# Patient Record
Sex: FEMALE | Race: WHITE | NOT HISPANIC OR LATINO | URBAN - METROPOLITAN AREA
[De-identification: names, ages, dates, MRNs, and addresses within clinical notes are randomized per-mention and may not be internally consistent; named-entity substitution may affect disease eponyms.]

---

## 2022-07-13 ENCOUNTER — EMERGENCY (EMERGENCY)
Facility: HOSPITAL | Age: 32
LOS: 1 days | Discharge: HOME | End: 2022-07-13
Admitting: EMERGENCY MEDICINE

## 2022-07-13 VITALS
HEART RATE: 64 BPM | SYSTOLIC BLOOD PRESSURE: 126 MMHG | OXYGEN SATURATION: 100 % | RESPIRATION RATE: 18 BRPM | DIASTOLIC BLOOD PRESSURE: 65 MMHG | TEMPERATURE: 98 F

## 2022-07-13 PROCEDURE — 99283 EMERGENCY DEPT VISIT LOW MDM: CPT

## 2022-07-13 PROCEDURE — 99053 MED SERV 10PM-8AM 24 HR FAC: CPT

## 2022-07-13 NOTE — ED ADULT TRIAGE NOTE - ESI TRIAGE ACUITY LEVEL, MLM
Progress Note  Patient: Jordin Jo  Unit/Bed: Q069/O380-63  YOB: 1971  MRN: [id number]  Acct: [de-identified]   Admitting Diagnosis: Palpitations [R00.2]  Hypokalemia [E87.6]  Hypomagnesemia [E83.42]  Essential hypertension [I10]  Hyperglycemia [R73.9]  Drug noncompliance [Z91.14]  Encounter for medication refill [Z76.0]  Uncontrolled type 2 diabetes mellitus with hyperglycemia ([address] [E11.65]  Chest pain, unspecified type [R07.9]  Admit Date:  2022  Hospital Day: 0    Chief Complaint: CP HypoK    Histories:  Past Medical History:   Diagnosis Date    CAD (coronary artery disease) 2022    CAD (coronary artery disease) 2022    Depression     Environmental allergies     Gastroparesis     GERD (gastroesophageal reflux disease)     Headache     Dr. Melisa Khoury HPV in female     Hyperlipidemia     Hypertension     Leukocytosis     Migraines     Type II or unspecified type diabetes mellitus without mention of complication, not stated as uncontrolled      Past Surgical History:   Procedure Laterality Date    BONE MARROW BIOPSY      (-[facility]    BREAST REDUCTION SURGERY     [address] [place] [address]      (-)[facility]    CHOLECYSTECTOMY      ENDOMETRIAL ABLATION  ?     GALLBLADDER SURGERY       Family History   Problem Relation Age of Onset    Heart Disease Mother     Diabetes Father     Heart Disease Father     Cancer Father         thyroid    No Known Problems Sister     No Known Problems Paternal Grandfather     No Known Problems Paternal Grandmother     No Known Problems Maternal Grandmother     No Known Problems Maternal Grandfather     No Known Problems Brother     No Known Problems Other     Breast Cancer Neg Hx     Colon Cancer Neg Hx     Eclampsia Neg Hx     Hypertension Neg Hx     Ovarian Cancer Neg Hx      Labor Neg Hx     Spont Abortions Neg Hx     Stroke Neg Hx      Social History Socioeconomic History    Marital status:      Spouse name: None    Number of children: None    Years of education: None    Highest education level: None   Occupational History    None   Tobacco Use    Smoking status: Never Smoker    Smokeless tobacco: Never Used   Vaping Use    Vaping Use: Never used   Substance and Sexual Activity    Alcohol use: Yes     Alcohol/week: 0.0 standard drinks     Comment: ocassionally    Drug use: No    Sexual activity: Not Currently     Partners: Male   Other Topics Concern    None   Social History Narrative    None     Social Determinants of Health     Financial Resource Strain: High Risk    Difficulty of Paying Living Expenses: Hard   Food Insecurity: Food Insecurity Present    Worried About Running Out of Food in the Last Year: Often true    Gayle of Food in the Last Year: Often true   Transportation Needs:     Lack of Transportation (Medical): Not on file    Lack of Transportation (Non-Medical): Not on file   Physical Activity:     Days of Exercise per Week: Not on file    Minutes of Exercise per Session: Not on file   Stress:     Feeling of Stress : Not on file   Social Connections:     Frequency of Communication with Friends and Family: Not on file    Frequency of Social Gatherings with Friends and Family: Not on file    Attends [employer] Services: Not on file    Active Member of [address] [place] [address] or Organizations: Not on file    Attends Club or Organization Meetings: Not on file    Marital Status: Not on file   Intimate Partner Violence:     Fear of Current or Ex-Partner: Not on file    Emotionally Abused: Not on file    Physically Abused: Not on file    Sexually Abused: Not on file   Housing Stability:     Unable to Pay for Housing in the Last Year: Not on file    Number of [place] in the Last Year: Not on file    Unstable Housing in the Last Year: Not on file       Subjective/HPI  More diarrhea this am. This is chronic for her.  No cp no Priority: Low    Essential hypertension [I10]      Priority: Low        Assessment/Plan:  1. CP- does not appear to be cardiac. 2. HTN - stable low salt diet. Continue meds  3. HypoK- likely secondary to Chronic Diarrhea but can not rule out primary Hyperaldosteronism. replace K. And Mag. She wants smaller tab. I will change it to 20 (2tabs) bid. Total 80 meq qd. 4. Will add PO Mag as well. 5. LVEF 55%  6. OK for dc- f/u w me 3 weeks.  I will recheck Labs as out pt.           Electronically signed by Chandler Bethea MD on 6/26/2022 at 10:53 AM 4

## 2022-07-13 NOTE — ED ADULT TRIAGE NOTE - CHIEF COMPLAINT QUOTE
Pt c/o rash and itching to chest, arms, abdomen and legs starting thursday. Denies new detergents, soaps or household products. Denies Pmhx known allergies. Pt c/o rash and itching to chest, arms, abdomen and legs starting thursday. Denies new detergents, soaps or household products. Denies bug bites, Pmhx or known allergies.

## 2022-07-14 VITALS
TEMPERATURE: 98 F | RESPIRATION RATE: 16 BRPM | OXYGEN SATURATION: 100 % | HEART RATE: 70 BPM | SYSTOLIC BLOOD PRESSURE: 123 MMHG | DIASTOLIC BLOOD PRESSURE: 80 MMHG

## 2022-07-14 DIAGNOSIS — Z90.49 ACQUIRED ABSENCE OF OTHER SPECIFIED PARTS OF DIGESTIVE TRACT: Chronic | ICD-10-CM

## 2022-07-14 DIAGNOSIS — Z90.89 ACQUIRED ABSENCE OF OTHER ORGANS: Chronic | ICD-10-CM

## 2022-07-14 RX ORDER — DIPHENHYDRAMINE HCL 50 MG
50 CAPSULE ORAL ONCE
Refills: 0 | Status: COMPLETED | OUTPATIENT
Start: 2022-07-14 | End: 2022-07-14

## 2022-07-14 RX ORDER — MOMETASONE FUROATE 1 MG/G
1 CREAM TOPICAL
Qty: 3 | Refills: 0
Start: 2022-07-14 | End: 2022-07-23

## 2022-07-14 RX ADMIN — Medication 50 MILLIGRAM(S): at 01:33

## 2022-07-14 RX ADMIN — Medication 50 MILLIGRAM(S): at 01:34

## 2022-07-14 NOTE — ED ADULT NURSE NOTE - CHIEF COMPLAINT QUOTE
Pt c/o rash and itching to chest, arms, abdomen and legs starting thursday. Denies new detergents, soaps or household products. Denies bug bites, Pmhx or known allergies.

## 2022-07-14 NOTE — ED PROVIDER NOTE - PATIENT PORTAL LINK FT
You can access the FollowMyHealth Patient Portal offered by [facility] by registering at the following website: [email or web address]. By joining [employer]’s FollowMyHealth portal, you will also be able to view your health information using other applications (apps) compatible with our system.

## 2022-07-14 NOTE — ED PROVIDER NOTE - NSFOLLOWUPINSTRUCTIONS_ED_ALL_ED_FT
Take the medication as prescribed,  Advance activity as tolerated.  Continue all previously prescribed medications as directed.  Follow up with your primary care physician in 48-72 hours- bring copies of your results.  Return to the ER for worsening or persistent symptoms, and/or ANY NEW OR CONCERNING SYMPTOMS. If you have issues obtaining follow up, please call: [phone] to obtain a doctor or specialist who takes your insurance in your area.  You may call [phone] to make an appointment with the internal medicine clinic.

## 2022-07-14 NOTE — ED PROVIDER NOTE - CLINICAL SUMMARY MEDICAL DECISION MAKING FREE TEXT BOX
31 Y/O F denies PMH PSH Tonsillectomy, Appendectomy states she developed an itchy rash on her chest on Thursday that has spread around her body. Pt states it has spread to her abdomen, arms and legs. Pt is well appearing, no oral mucosal involvement of slough. [place], will send tickborne disease panel.

## 2022-07-14 NOTE — ED PROVIDER NOTE - OBJECTIVE STATEMENT
33 Y/O F denies PMH PSH Tonsillectomy, Appendectomy states she developed an itchy rash on her chest on Thursday that has spread around her body. Pt states it has spread to her abdomen, arms and legs. Pt denies new creams/soaps or detergents. [place]. Pt states she has been applying hydrocortisone cream with only temporary improvement. Pt denies CP or SOB, denies possibility of pregnancy, denies any other sx or acute complaints.

## 2022-07-17 LAB
A PHAGOCYTOPH IGG TITR SER IF: SIGNIFICANT CHANGE UP TITER
B BURGDOR AB SER QL IA: NEGATIVE — SIGNIFICANT CHANGE UP
B MICROTI IGG TITR SER: SIGNIFICANT CHANGE UP TITER
E CHAFFEENSIS IGG TITR SER IF: SIGNIFICANT CHANGE UP TITER

## 2022-08-01 NOTE — ED ADULT NURSE NOTE - OBJECTIVE STATEMENT
Patient A&Ox4 ambulatory c/o rash and itching to chest, arms, abdomen and legs starting last Thursday. [place]. Patient denies PMH. Respirations even and unlabored. Medicated per orders. Denies cp, sob, n/v/d and fevers and chills. new detergents, soaps or household products and bug bites. Patient discharged home.
01-Aug-2022 21:05